# Patient Record
Sex: FEMALE | Race: OTHER | URBAN - METROPOLITAN AREA
[De-identification: names, ages, dates, MRNs, and addresses within clinical notes are randomized per-mention and may not be internally consistent; named-entity substitution may affect disease eponyms.]

---

## 2019-07-08 NOTE — PATIENT DISCUSSION
The patient expressed a desire to see through the full range of vision from distance, to middle, to near without glasses. The limitations of advanced lens technology were reviewed and the recommendation was made for an extended depth of focus lens (Symfony) in combination with a multifocal lens. Patient understands that each lens will provide only 2 of the 3 ranges of vision. Side effects, specifically halos, reduced contrast, and a 1 in 500 exchange rate due to failure to adapt to the lens were discussed as was the need for enhancement in some cases. The patient elects to proceed with RLE Symfony OD, goal of emmetropia.

## 2019-07-08 NOTE — PATIENT DISCUSSION
The patient desires to have better * vision. The patient was advised on the option of * to improve * vision. The patient elects to proceed with * O*, goal of *.

## 2019-07-08 NOTE — PATIENT DISCUSSION
The patient desires to reduce her dependence on glasses and elects to proceed with a refractive lens exchange.

## 2019-08-14 NOTE — PATIENT DISCUSSION
A full and complete discussion was given to the patient about Refractive Lens Exchange. The various types of intraocular lenses were discussed, their strength and weaknesses. All questions were answered and no guarantees were given.

## 2019-09-11 NOTE — PATIENT DISCUSSION
The patient is 1 month status post cataract surgery. The eye has healed well with no signs of infection or inflammation. All surgery associated drops may be stopped.

## 2020-07-01 NOTE — PATIENT DISCUSSION
The patient desires to reduce her dependence on glasses and elects to proceed with a refractive lens exchange. Detail Level: Zone

## 2022-05-19 ENCOUNTER — NEW PATIENT (OUTPATIENT)
Dept: URBAN - METROPOLITAN AREA CLINIC 9 | Facility: CLINIC | Age: 67
End: 2022-05-19

## 2022-05-19 DIAGNOSIS — H40.013: ICD-10-CM

## 2022-05-19 DIAGNOSIS — H04.123: ICD-10-CM

## 2022-05-19 DIAGNOSIS — H25.13: ICD-10-CM

## 2022-05-19 DIAGNOSIS — H52.03: ICD-10-CM

## 2022-05-19 PROCEDURE — 92015 DETERMINE REFRACTIVE STATE: CPT

## 2022-05-19 PROCEDURE — 92004 COMPRE OPH EXAM NEW PT 1/>: CPT

## 2022-05-19 PROCEDURE — 92133 CPTRZD OPH DX IMG PST SGM ON: CPT

## 2022-05-19 ASSESSMENT — VISUAL ACUITY
OD_CC: 20/50+1
OU_CC: 20/50-1
OS_CC: 20/60+1

## 2022-05-19 ASSESSMENT — KERATOMETRY
OD_AXISANGLE_DEGREES: 4
OS_K1POWER_DIOPTERS: 44.25
OD_K1POWER_DIOPTERS: 44.00
OS_AXISANGLE_DEGREES: 151
OS_K2POWER_DIOPTERS: 44.50
OS_AXISANGLE2_DEGREES: 61
OD_AXISANGLE2_DEGREES: 94
OD_K2POWER_DIOPTERS: 44.50

## 2022-05-19 ASSESSMENT — TONOMETRY
OS_IOP_MMHG: 13
OD_IOP_MMHG: 13

## 2022-05-19 NOTE — PATIENT DISCUSSION
Educated patient on findings. 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 Will order HVF 24-2 OU after cataract surgery to confirm. Monitor.

## 2022-05-19 NOTE — PATIENT DISCUSSION
Educated patient on findings. Based on C/D asymmetry and OCT thinning OU. IOP within normal limits today OU. Will order baseline HVF 24-2 OU after cataract surgery to assess for treatment. Monitor.

## 2022-05-19 NOTE — PATIENT DISCUSSION
Educated patient on findings. Visually significant. Refer to Dr. Ginnie Collet for cataract evaluation. Updated SRx released to patient in event she decides not to proceed with consult. Monitor.

## 2022-08-15 NOTE — PATIENT DISCUSSION
Stat ECG was performed on 08/15/22 at 1242  ECG was given to Chantell KELLEY   We discussed episcleritis and possible etiologies including systemic causes.